# Patient Record
Sex: MALE | URBAN - METROPOLITAN AREA
[De-identification: names, ages, dates, MRNs, and addresses within clinical notes are randomized per-mention and may not be internally consistent; named-entity substitution may affect disease eponyms.]

---

## 2019-04-23 ENCOUNTER — APPOINTMENT (RX ONLY)
Dept: URBAN - METROPOLITAN AREA CLINIC 23 | Facility: CLINIC | Age: 39
Setting detail: DERMATOLOGY
End: 2019-04-23

## 2019-04-23 DIAGNOSIS — Z41.9 ENCOUNTER FOR PROCEDURE FOR PURPOSES OTHER THAN REMEDYING HEALTH STATE, UNSPECIFIED: ICD-10-CM

## 2019-04-23 PROCEDURE — ? FILLERS

## 2019-04-23 PROCEDURE — ? DYSPORT

## 2019-04-23 NOTE — PROCEDURE: DYSPORT
Additional Area 2 Location: bunny lines
Anterior Platysmal Bands Units: 0
Lot #: [id number]
Additional Area 1 Location: lateral brows
Detail Level: Zone
Forehead Units: [address] [facility]
Additional Area 4 Location: crows feet
Glabellar Complex Units: 15
Post-Care Instructions: Patient instructed to not lie down for 4 hours, limit physical activity for 24 hours and avoid manipulation of the treated areas.
Additional Area 6 Location: glabella
Dilution (U/ 0.1cc): 1.5
Expiration Date (Month Year): 10/31/2019
Consent: Written consent obtained. Risks include but not limited to lid/brow ptosis, bruising, swelling, diplopia, temporary effect, incomplete chemical denervation.

## 2019-04-23 NOTE — PROCEDURE: FILLERS
Brows Filler  Volume In Cc: 0
Include Cannula Information In Note?: Yes
Include Cannula Brand?: DermaSculpt
Include Cannula Size?: 25G
Map Statment: See [address] for Complete Details
Include Cannula Information In Note?: No
Additional Area 1 Location: forehead lines
Additional Anesthesia Volume In Cc: 6
Additional Area 1 Volume In Cc: 0.2
Expiration Date (Month Year): 05/31/2021
Include Cannula Length?: 1.5 inch
Additional Area 1 Location: Lips, Submental
Additional Area 2 Location: nasolabial folds, marionettes lines using a acannula lateral mouth lines, lips
Additional Area 1 Location: left dorsal hand using an acannula
Lot #: [id number]
Price (Use Numbers Only, No Special Characters Or $): 0.00
Additional Area 2 Location: lateral jawline, Left marionette
Anesthesia Volume In Cc: 0.3
Additional Area 3 Location: Tear troughs with a Cannula, nasalabial folds, oral commissure, marionette lines
Additional Area 1 Location: left dorsal hand
Additional Area 3 Location: lateral jaw
Anesthesia Type: 1% lidocaine with epinephrine
Additional Area 4 Location: left hand
Detail Level: Zone
Post-Care Instructions: Patient instructed to apply ice to reduce swelling.
Consent: Written consent obtained. Risks include but not limited to bruising, beading, irregular texture, ulceration, infection, allergic reaction, scar formation, incomplete augmentation, temporary nature, procedural pain.
Additional Area 4 Location: Left Cheek, Left nasalabial fold
Additional Area 5 Location: Marionette lines, Lateral Jawline
Lot #: [id number]
Lot #: [id number]
Expiration Date (Month Year): 07/31/2021
Lot #: [id number]
Filler: Restylane

## 2019-06-28 ENCOUNTER — APPOINTMENT (RX ONLY)
Dept: URBAN - METROPOLITAN AREA CLINIC 23 | Facility: CLINIC | Age: 39
Setting detail: DERMATOLOGY
End: 2019-06-28

## 2019-06-28 DIAGNOSIS — Z41.9 ENCOUNTER FOR PROCEDURE FOR PURPOSES OTHER THAN REMEDYING HEALTH STATE, UNSPECIFIED: ICD-10-CM

## 2019-06-28 PROCEDURE — ? ADDITIONAL NOTES

## 2019-06-28 NOTE — PROCEDURE: ADDITIONAL NOTES
Additional Notes: Pt will reschedule. Discussed having Ematrix done first, Then he will come back for fillers.
Detail Level: Zone

## 2019-09-18 ENCOUNTER — APPOINTMENT (RX ONLY)
Dept: URBAN - METROPOLITAN AREA CLINIC 23 | Facility: CLINIC | Age: 39
Setting detail: DERMATOLOGY
End: 2019-09-18

## 2019-09-18 DIAGNOSIS — Z41.9 ENCOUNTER FOR PROCEDURE FOR PURPOSES OTHER THAN REMEDYING HEALTH STATE, UNSPECIFIED: ICD-10-CM

## 2019-09-18 PROCEDURE — ? EMATRIX

## 2019-09-18 ASSESSMENT — LOCATION DETAILED DESCRIPTION DERM
LOCATION DETAILED: LEFT CENTRAL MALAR CHEEK
LOCATION DETAILED: LEFT SUPERIOR ANTERIOR NECK

## 2019-09-18 ASSESSMENT — LOCATION ZONE DERM
LOCATION ZONE: NECK
LOCATION ZONE: FACE

## 2019-09-18 ASSESSMENT — LOCATION SIMPLE DESCRIPTION DERM
LOCATION SIMPLE: LEFT CHEEK
LOCATION SIMPLE: LEFT ANTERIOR NECK

## 2019-09-18 NOTE — PROCEDURE: EMATRIX
Program: JIMMY
Consent: Written consent obtained, risks reviewed including but not limited to crusting, scabbing, blistering, scarring, darker or lighter pigmentary change, paradoxical hair regrowth, incomplete removal of hair and infection.
Passes (Optional): 2
Endpoint: Immediate endpoint: perifollicular erythema and edema. Hydroboost applied and samples given to pt. Post care reviewed with patient.
Fluence: 62 J
Pre-Procedure: Prior to the procedure all persons present in the exam put on protective eyewear.
Detail Level: Zone
Post-Care Instructions: I reviewed with the patient in detail post-care instructions. Patient should avoid sun for a minimum of 4 weeks before and after treatment.
Use Hsv Prophylaxis: No

## 2019-10-30 ENCOUNTER — RX ONLY (OUTPATIENT)
Age: 39
Setting detail: RX ONLY
End: 2019-10-30

## 2019-10-30 RX ORDER — TRETINOIN 0.5 MG/G
LOTION TOPICAL
Qty: 1 | Refills: 6 | Status: ERX | COMMUNITY
Start: 2019-10-30

## 2020-05-13 ENCOUNTER — RX ONLY (OUTPATIENT)
Age: 40
Setting detail: RX ONLY
End: 2020-05-13

## 2020-05-13 RX ORDER — TRETINOIN 0.5 MG/G
LOTION TOPICAL
Qty: 1 | Refills: 6 | Status: ERX

## 2020-07-15 ENCOUNTER — APPOINTMENT (RX ONLY)
Dept: URBAN - METROPOLITAN AREA CLINIC 23 | Facility: CLINIC | Age: 40
Setting detail: DERMATOLOGY
End: 2020-07-15

## 2020-07-15 VITALS — TEMPERATURE: 97.7 F

## 2020-07-15 DIAGNOSIS — Z41.9 ENCOUNTER FOR PROCEDURE FOR PURPOSES OTHER THAN REMEDYING HEALTH STATE, UNSPECIFIED: ICD-10-CM

## 2020-07-15 PROCEDURE — ? RECOMMENDATIONS

## 2020-07-15 PROCEDURE — ? FILLERS

## 2020-07-15 NOTE — PROCEDURE: RECOMMENDATIONS
Detail Level: Detailed
Recommendation Preamble: The following recommendations were made during the visit: patient to monitor for any abnormalities and to follow up in 2 months

## 2020-07-15 NOTE — PROCEDURE: FILLERS
Include Cannula Information In Note?: No
Dorsal Hands Filler  Volume In Cc: 0
Additional Area 1 Location: forehead fine lines, glabella fine lines, NLFs
Additional Area 5 Location: left cheek
Include Cannula Length?: 1.5 inch
Additional Area 1 Location: chin
Additional Area 1 Volume In Cc: 1
Additional Area 2 Location: chin line, vermillion lips, lateral mouth, upper line brow, lateral cheeks
Lot #: [id number]
Detail Level: Detailed
Include Cannula Brand?: DermaSculpt
Additional Area 3 Location: oral commissures
Expiration Date (Month Year): 09/30/22
Price (Use Numbers Only, No Special Characters Or $): 0.00
Filler: Restylane-L
Additional Area 5 Location: oral commissures, upper lip lines, vermillion lips
Additional Area 4 Location: NLFs, lips, jawline, upper lip lines
Lot #: [id number]
Additional Area 5 Location: , jawline, lateral cheeks   marionette lines
Additional Area 1 Location: lips, vermillion lips.
Map Statment: See [address] for Complete Details
Include Cannula Size?: 25G
Anesthesia Type: 1% lidocaine with epinephrine
Expiration Date (Month Year): 03/01/20
Additional Area 2 Location: oral commissures ,marionettes lines ,lips, chin lines
Lot #: [id number]
Additional Area 3 Location: perioral fine lines, vermillion lips, NLFs. marionette lines upper lip lines
Additional Area 1 Location: lateral mouth, marionette lines, lateral cheeks.
Anesthesia Volume In Cc: 0.3
Lot #: [id number]
Consent: Written consent obtained. Risks include but not limited to bruising, beading, irregular texture, ulceration, infection, allergic reaction, scar formation, incomplete augmentation, temporary nature, procedural pain.
Expiration Date (Month Year): 09-
Post-Care Instructions: Patient instructed to apply ice to reduce swelling.
Additional Area 4 Location: era lobes, jawline
Additional Anesthesia Volume In Cc: 6
Expiration Date (Month Year): 05/19/2020

## 2020-08-19 ENCOUNTER — APPOINTMENT (RX ONLY)
Dept: URBAN - METROPOLITAN AREA CLINIC 23 | Facility: CLINIC | Age: 40
Setting detail: DERMATOLOGY
End: 2020-08-19

## 2020-08-19 DIAGNOSIS — Z41.9 ENCOUNTER FOR PROCEDURE FOR PURPOSES OTHER THAN REMEDYING HEALTH STATE, UNSPECIFIED: ICD-10-CM

## 2020-08-19 PROCEDURE — ? EMATRIX

## 2020-08-19 ASSESSMENT — LOCATION SIMPLE DESCRIPTION DERM: LOCATION SIMPLE: LEFT CHEEK

## 2020-08-19 ASSESSMENT — LOCATION DETAILED DESCRIPTION DERM: LOCATION DETAILED: LEFT SUPERIOR MEDIAL BUCCAL CHEEK

## 2020-08-19 ASSESSMENT — LOCATION ZONE DERM: LOCATION ZONE: FACE

## 2020-08-19 NOTE — PROCEDURE: EMATRIX
Consent: Written consent obtained, risks reviewed including but not limited to crusting, scabbing, blistering, scarring, darker or lighter pigmentary change, paradoxical hair regrowth, incomplete removal of hair and infection.
Use Hsv Prophylaxis: No
Passes (Optional): 1
Detail Level: Zone
Fluence: 62 J
Post-Care Instructions: I reviewed with the patient in detail post-care instructions. Patient should avoid sun for a minimum of 4 weeks before and after treatment.
Endpoint: Immediate endpoint: perifollicular erythema and edema. PRP applied and injected into tear trough. Post care reviewed with patient.
Tip (Optional): [employer]
Program: JIMMY

## 2020-10-16 ENCOUNTER — APPOINTMENT (RX ONLY)
Dept: URBAN - METROPOLITAN AREA CLINIC 23 | Facility: CLINIC | Age: 40
Setting detail: DERMATOLOGY
End: 2020-10-16

## 2020-10-16 DIAGNOSIS — Z41.9 ENCOUNTER FOR PROCEDURE FOR PURPOSES OTHER THAN REMEDYING HEALTH STATE, UNSPECIFIED: ICD-10-CM

## 2020-10-16 PROCEDURE — ? DYSPORT

## 2020-10-16 NOTE — PROCEDURE: DYSPORT
R Brow Units: 0
Show Masseter Units: Yes
Additional Area 5 Location: high forehead
Show Lcl Units: No
Additional Area 1 Location: glabella
Expiration Date (Month Year): 02/28/21
Lot #: [id number]
Additional Area 4 Location: neck bands
Glabellar Complex Units: [address] [facility]
Consent: Written consent obtained. Risks include but not limited to lid/brow ptosis, bruising, swelling, diplopia, temporary effect, incomplete chemical denervation.
Additional Area 6 Location: chin
Additional Area 3 Location: crows feet
Additional Area 2 Location: forehead
Dilution (U/ 0.1cc): 1.5
Post-Care Instructions: Patient instructed to not lie down for 4 hours, limit physical activity for 24 hours and avoid manipulation of the treated areas.
Detail Level: Detailed

## 2020-11-13 ENCOUNTER — RX ONLY (OUTPATIENT)
Age: 40
Setting detail: RX ONLY
End: 2020-11-13

## 2020-11-13 RX ORDER — TRETINOIN 0.5 MG/G
LOTION TOPICAL
Qty: 1 | Refills: 3 | Status: ERX

## 2020-12-04 ENCOUNTER — APPOINTMENT (RX ONLY)
Dept: URBAN - METROPOLITAN AREA CLINIC 23 | Facility: CLINIC | Age: 40
Setting detail: DERMATOLOGY
End: 2020-12-04

## 2020-12-10 ENCOUNTER — APPOINTMENT (RX ONLY)
Dept: URBAN - METROPOLITAN AREA CLINIC 23 | Facility: CLINIC | Age: 40
Setting detail: DERMATOLOGY
End: 2020-12-10

## 2020-12-10 DIAGNOSIS — Z41.9 ENCOUNTER FOR PROCEDURE FOR PURPOSES OTHER THAN REMEDYING HEALTH STATE, UNSPECIFIED: ICD-10-CM

## 2020-12-10 PROCEDURE — ? DYSPORT

## 2020-12-10 NOTE — PROCEDURE: DYSPORT
Additional Area 1 Location: high forehead (2cm above brow)
Show Additional Area 4: Yes
[facility] Units: 0
Show Right And Left Pupillary Line Units: No
Additional Area 5 Location: high forehead
Dilution (U/ 0.1cc): 1.5
Detail Level: Detailed
Additional Area 4 Location: left lateral brows
Post-Care Instructions: Patient instructed to not lie down for 4 hours, limit physical activity for 24 hours and avoid manipulation of the treated areas.
Glabellar Complex Units: 10
Expiration Date (Month Year): 03/31/21
Lot #: [id number]
Additional Area 3 Location: high forehead 2 cm above brows
Consent: Written consent obtained. Risks include but not limited to lid/brow ptosis, bruising, swelling, diplopia, temporary effect, incomplete chemical denervation.
Additional Area 6 Location: chin
Additional Area 2 Location: lateral eyes

## 2021-01-21 ENCOUNTER — APPOINTMENT (RX ONLY)
Dept: URBAN - METROPOLITAN AREA CLINIC 23 | Facility: CLINIC | Age: 41
Setting detail: DERMATOLOGY
End: 2021-01-21

## 2021-01-21 VITALS — TEMPERATURE: 97.5 F

## 2021-01-21 DIAGNOSIS — L82.0 INFLAMED SEBORRHEIC KERATOSIS: ICD-10-CM

## 2021-01-21 DIAGNOSIS — D18.0 HEMANGIOMA: ICD-10-CM

## 2021-01-21 PROBLEM — D18.01 HEMANGIOMA OF SKIN AND SUBCUTANEOUS TISSUE: Status: ACTIVE | Noted: 2021-01-21

## 2021-01-21 PROCEDURE — ? BENIGN DESTRUCTION

## 2021-01-21 PROCEDURE — 17110 DESTRUCTION B9 LES UP TO 14: CPT

## 2021-01-21 ASSESSMENT — LOCATION DETAILED DESCRIPTION DERM
LOCATION DETAILED: LEFT MID-UPPER BACK
LOCATION DETAILED: MID POSTERIOR NECK

## 2021-01-21 ASSESSMENT — LOCATION SIMPLE DESCRIPTION DERM
LOCATION SIMPLE: POSTERIOR NECK
LOCATION SIMPLE: LEFT UPPER BACK

## 2021-01-21 ASSESSMENT — LOCATION ZONE DERM
LOCATION ZONE: NECK
LOCATION ZONE: TRUNK

## 2021-01-21 NOTE — PROCEDURE: BENIGN DESTRUCTION
Consent: The patient's consent was obtained including but not limited to risks of crusting, scabbing, blistering, scarring, darker or lighter pigmentary change, recurrence, incomplete removal and infection.
Add 52 Modifier (Optional): no
Treatment Number (Will Not Render If 0): 0
Medical Necessity Clause: This procedure was medically necessary because the lesions that were treated were:
Post-Care Instructions: I reviewed with the patient in detail post-care instructions. Patient is to wear sunprotection, and avoid picking at any of the treated lesions. Pt may apply Vaseline to crusted or scabbing areas.
Detail Level: Detailed
Anesthesia Volume In Cc: 0.5
Medical Necessity Information: It is in your best interest to select a reason for this procedure from the list below. All of these items fulfill various CMS LCD requirements except the new and changing color options.
Anesthesia Type: 1% Xylocaine with [id number] epinephrine and sodium bicarbonate

## 2021-03-05 ENCOUNTER — RX ONLY (OUTPATIENT)
Age: 41
Setting detail: RX ONLY
End: 2021-03-05

## 2021-03-05 RX ORDER — TRETINOIN 0.5 MG/G
LOTION TOPICAL
Qty: 1 | Refills: 2 | Status: ERX

## 2021-04-20 ENCOUNTER — APPOINTMENT (RX ONLY)
Dept: URBAN - METROPOLITAN AREA CLINIC 23 | Facility: CLINIC | Age: 41
Setting detail: DERMATOLOGY
End: 2021-04-20

## 2021-04-20 VITALS — TEMPERATURE: 97.5 F

## 2021-04-20 DIAGNOSIS — D18.0 HEMANGIOMA: ICD-10-CM

## 2021-04-20 PROBLEM — D23.39 OTHER BENIGN NEOPLASM OF SKIN OF OTHER PARTS OF FACE: Status: ACTIVE | Noted: 2021-04-20

## 2021-04-20 PROBLEM — D18.01 HEMANGIOMA OF SKIN AND SUBCUTANEOUS TISSUE: Status: ACTIVE | Noted: 2021-04-20

## 2021-04-20 PROCEDURE — ? BENIGN DESTRUCTION

## 2021-04-20 PROCEDURE — ? COUNSELING

## 2021-04-20 PROCEDURE — 17110 DESTRUCTION B9 LES UP TO 14: CPT

## 2021-04-20 ASSESSMENT — LOCATION DETAILED DESCRIPTION DERM
LOCATION DETAILED: LEFT MEDIAL FOREHEAD
LOCATION DETAILED: LEFT CENTRAL MALAR CHEEK

## 2021-04-20 ASSESSMENT — LOCATION SIMPLE DESCRIPTION DERM
LOCATION SIMPLE: LEFT FOREHEAD
LOCATION SIMPLE: LEFT CHEEK

## 2021-04-20 ASSESSMENT — LOCATION ZONE DERM: LOCATION ZONE: FACE

## 2021-04-20 NOTE — PROCEDURE: BENIGN DESTRUCTION
Render Post-Care Instructions In Note?: no
Detail Level: Detailed
Medical Necessity Clause: This procedure was medically necessary because the lesions that were treated were:
Medical Necessity Information: It is in your best interest to select a reason for this procedure from the list below. All of these items fulfill various CMS LCD requirements except the new and changing color options.
Post-Care Instructions: I reviewed with the patient in detail post-care instructions. Patient is to wear sunprotection, and avoid picking at any of the treated lesions. Pt may apply Vaseline to crusted or scabbing areas.
Consent: The patient's consent was obtained including but not limited to risks of crusting, scabbing, blistering, scarring, darker or lighter pigmentary change, recurrence, incomplete removal and infection.
Anesthesia Volume In Cc: 0.3
Treatment Number (Will Not Render If 0): 0

## 2021-07-14 ENCOUNTER — APPOINTMENT (RX ONLY)
Dept: URBAN - METROPOLITAN AREA CLINIC 23 | Facility: CLINIC | Age: 41
Setting detail: DERMATOLOGY
End: 2021-07-14

## 2021-07-14 DIAGNOSIS — Z41.9 ENCOUNTER FOR PROCEDURE FOR PURPOSES OTHER THAN REMEDYING HEALTH STATE, UNSPECIFIED: ICD-10-CM

## 2021-07-14 PROCEDURE — ? EMATRIX

## 2021-07-14 ASSESSMENT — LOCATION SIMPLE DESCRIPTION DERM: LOCATION SIMPLE: RIGHT CHEEK

## 2021-07-14 ASSESSMENT — LOCATION DETAILED DESCRIPTION DERM: LOCATION DETAILED: RIGHT INFERIOR CENTRAL MALAR CHEEK

## 2021-07-14 ASSESSMENT — LOCATION ZONE DERM: LOCATION ZONE: FACE

## 2021-07-14 NOTE — PROCEDURE: EMATRIX
Post-Care Instructions: I reviewed with the patient in detail post-care instructions. Patient should avoid sun for a minimum of 4 weeks before and after treatment.
Fluence: 56 J
Program: JIMMY
Endpoint: Immediate endpoint: perifollicular erythema and edema. PRP applied and injected into tear trough. Post care reviewed with patient.
Detail Level: Zone
Passes (Optional): 1
Pulses (Optional): 4
Consent: Written consent obtained, risks reviewed including but not limited to crusting, scabbing, blistering, scarring, darker or lighter pigmentary change, paradoxical hair regrowth, incomplete removal of hair and infection.
Device Serial Number (Optional): face
Use Hsv Prophylaxis: No

## 2021-08-25 ENCOUNTER — APPOINTMENT (RX ONLY)
Dept: URBAN - METROPOLITAN AREA CLINIC 23 | Facility: CLINIC | Age: 41
Setting detail: DERMATOLOGY
End: 2021-08-25

## 2021-08-25 DIAGNOSIS — Z41.9 ENCOUNTER FOR PROCEDURE FOR PURPOSES OTHER THAN REMEDYING HEALTH STATE, UNSPECIFIED: ICD-10-CM

## 2021-08-25 PROCEDURE — ? FILLERS

## 2021-08-25 NOTE — PROCEDURE: FILLERS
Additional Area 2 Location: vermillion lips, lateral mouth, chin
Additional Area 4 Volume In Cc: 0
Post-Care Instructions: Patient instructed to apply ice to reduce swelling.
Additional Area 5 Location: oral commissures, marionette lines, jawline
Include Cannula Information In Note?: No
Include Cannula Length?: 1.5 inch
Include Cannula Brand?: DermaSculpt
Anesthesia Volume In Cc: 0.3
Include Cannula Size?: 25G
Additional Area 1 Location: lateral mouth, lips, perioral fine lines, glabellar fine lines.
Detail Level: Detailed
Lot #: [id number]
Additional Area 3 Location: perioral fine lines, vermillion lips, NLFs. marionette lines upper lip lines
Additional Area 4 Location: chin, lateral cheeks, NLFâs, marionette lines
Additional Anesthesia Volume In Cc: 6
Price (Use Numbers Only, No Special Characters Or $): 0.00
Additional Area 1 Location: chin lines, marionette lines (physician sample)
Expiration Date (Month Year): 2024-01-31
Include Cannula Information In Note?: Yes
Additional Area 5 Location: cheeks (cannula used) tear throughs
Lot #: [id number]
Map Statment: See [address] for Complete Details
Additional Area 1 Location: upper lip lines , vermilion lip, oral commissures, marionette lines
Filler: Restylane-L
Expiration Date (Month Year): 2024-01
Additional Area 5 Location: oral commissures, upper lip lines, vermillion lips
Lot #: [id number]
Additional Area 1 Location: with cannula to medial cheeks, tear through. with conventional needle to marionette lines.
Additional Area 2 Location: [facility]
Lot #: [id number]
Expiration Date (Month Year): 2023-01-31
Expiration Date (Month Year): 07/20/21
Additional Area 3 Location: right cheek, nfls, marionette lines
Additional Area 3 Volume In Cc: 1
Additional Area 4 Location: lips, upper lip lines marionette lines, oral commissures
Anesthesia Type: 1% lidocaine with epinephrine
Consent: Written consent obtained. Risks include but not limited to bruising, beading, irregular texture, ulceration, infection, allergic reaction, scar formation, incomplete augmentation, temporary nature, procedural pain.

## 2021-11-23 ENCOUNTER — RX ONLY (OUTPATIENT)
Age: 41
Setting detail: RX ONLY
End: 2021-11-23

## 2021-11-23 RX ORDER — TRETINOIN 0.5 MG/G
LOTION TOPICAL
Qty: 1 | Refills: 0 | Status: ERX | COMMUNITY
Start: 2021-11-23

## 2022-01-26 ENCOUNTER — APPOINTMENT (RX ONLY)
Dept: URBAN - METROPOLITAN AREA CLINIC 23 | Facility: CLINIC | Age: 42
Setting detail: DERMATOLOGY
End: 2022-01-26

## 2022-01-26 DIAGNOSIS — Z41.9 ENCOUNTER FOR PROCEDURE FOR PURPOSES OTHER THAN REMEDYING HEALTH STATE, UNSPECIFIED: ICD-10-CM

## 2022-01-26 PROCEDURE — ? EMATRIX

## 2022-01-26 ASSESSMENT — LOCATION ZONE DERM: LOCATION ZONE: FACE

## 2022-01-26 ASSESSMENT — LOCATION DETAILED DESCRIPTION DERM
LOCATION DETAILED: LEFT CENTRAL MALAR CHEEK
LOCATION DETAILED: LEFT SUPERIOR CENTRAL MALAR CHEEK

## 2022-01-26 ASSESSMENT — LOCATION SIMPLE DESCRIPTION DERM: LOCATION SIMPLE: LEFT CHEEK

## 2022-01-26 NOTE — PROCEDURE: EMATRIX
Post-Care Instructions: I reviewed with the patient in detail post-care instructions. Patient should avoid sun for a minimum of 4 weeks before and after treatment.
Fluence: 53 J
Consent: Written consent obtained, risks reviewed including but not limited to crusting, scabbing, blistering, scarring, darker or lighter pigmentary change, paradoxical hair regrowth, incomplete removal of hair and infection.
Detail Level: Zone
Price (Use Numbers Only, No Special Characters Or $): [address]
Device Serial Number (Optional): skin discoloration
Use Hsv Prophylaxis: No
Passes (Optional): 1
Pulses (Optional): [address]
Endpoint: Immediate endpoint: perifollicular erythema and edema. Post care reviewed with patient.
Program: JIMMY

## 2022-03-31 ENCOUNTER — RX ONLY (OUTPATIENT)
Age: 42
Setting detail: RX ONLY
End: 2022-03-31

## 2022-03-31 RX ORDER — TRETINOIN 0.5 MG/G
LOTION TOPICAL
Qty: 45 | Refills: 0 | Status: ERX

## 2022-04-04 ENCOUNTER — RX ONLY (OUTPATIENT)
Age: 42
Setting detail: RX ONLY
End: 2022-04-04

## 2022-04-04 RX ORDER — TRETINOIN 0.5 MG/G
LOTION TOPICAL
Qty: 45 | Refills: 0 | Status: ERX

## 2022-04-29 ENCOUNTER — APPOINTMENT (RX ONLY)
Dept: URBAN - METROPOLITAN AREA CLINIC 23 | Facility: CLINIC | Age: 42
Setting detail: DERMATOLOGY
End: 2022-04-29

## 2022-04-29 DIAGNOSIS — Z41.9 ENCOUNTER FOR PROCEDURE FOR PURPOSES OTHER THAN REMEDYING HEALTH STATE, UNSPECIFIED: ICD-10-CM

## 2022-04-29 PROCEDURE — ? IN-HOUSE DISPENSING PHARMACY

## 2022-04-29 NOTE — PROCEDURE: IN-HOUSE DISPENSING PHARMACY
Product 75 Unit Type: mg
Product 80 Units Dispensed: 0
see chief complaint quote
Product 6 Unit Type: grams
Name Of Product 4: Valium 5 mg
Product 11 Unit Type: jar(s)
Product 2 Amount/Unit (Numbers Only): 1
Product 21 Application Directions: Use as directed
Product 14 Amount/Unit (Numbers Only): [address]
Product 13 Application Directions: Apply to wound site every 48 hours as indicated.
Name Of Product 16: Latisse
Name Of Product 19: Upneeq ophthalmic solution
Name Of Product 7: Adal Stevens
Name Of Product 12: Skin Better Even tone Serum
Product 9 Application Directions: Apply to affected area on the face area am as indicated.
Product 1 Application Directions: Apply to entire face at bedtime as indicated.
Product 21 Unit Type: kit(s)
Product 13 Unit Type: unit(s)
Product 4 Application Directions: Take 1 tablet today in office prior to procedure for anxiety.
Name Of Product 22: Calcipotriene/ 5 FLuoracil cream
Render Product Pricing In Note: No
Product 12 Unit Type: bottle(s)
Product 16 Amount/Unit (Numbers Only): 5
Product 15 Application Directions: Take one tablet twice daily for 3-6 months
Product 3 Application Directions: Apply to face twice daily as indicated
Name Of Product 21: Defenage skincare
Name Of Product 13: Duoderm thin
Product 11 Application Directions: Apply to the face at bedtime
Product 6 Application Directions: Apply pea size amount with cereve cream to chest at bedtime only.
Name Of Product 9: Brightening pads 6%
Name Of Product 1: Tri-Kenisha cream
Product 15 Unit Type: tablets
Product 2 Application Directions: Apply a pea size amount to Entire face at bedtime
Product 14 Application Directions: Take one tablet prior to procedure. Continue twice daily x 3 days as indicated.
Name Of Product 8: Prednisone 20 mg
Product 17 Application Directions: Take one tablet 20 mg today in office and 10 mg tomorrow if needed for swelling. Dispense in office.
Product 1 Price/Unit (In Dollars): 0.00
Product 10 Application Directions: Take two tablets x3 days
Product 5 Application Directions: Apply 1 drop to both Eyelashes at bedtime only
Product 2 Unit Type: tube(s)
Detail Level: Detailed
Name Of Product 3: Hydroquinone brightening pads 8%
Name Of Product 15: Viviscal PRO
Product 8 Application Directions: Take 1 tablet for 3 days . Dispense in office.
Name Of Product 18: Chavo Fuentes
Name Of Product 11: Hydroquinone 8% pads
Product 12 Refills: 2
Name Of Product 6: Tretinoin 0.05% cream
Product 16 Application Directions: Apply to lashes at bedtime only
Product 19 Application Directions: Apply once daily to each eye.
Name Of Product 14: Valtrex 500mg
Product 7 Application Directions: Apply one drop in each eye
Product 12 Application Directions: Apply to the entire face in the Am and Pm
Name Of Product 10: Prednisone 20mg
Product 16 Unit Type: ml
Name Of Product 5: Latisse 3 ml
Product 15 Amount/Unit (Numbers Only): [address] [facility]
Product 22 Application Directions: Apply small amount to temple and forehead twice daily as indicated x 3 or 5 days
Product 13 Price/Unit (In Dollars): $10.00
Name Of Product 20: Apply pea size amount to entire face at bedtime only at bedtime only.
Product 6 Amount/Unit (Numbers Only): [address] [facility] [place]

## 2022-07-19 ENCOUNTER — APPOINTMENT (RX ONLY)
Dept: URBAN - METROPOLITAN AREA CLINIC 23 | Facility: CLINIC | Age: 42
Setting detail: DERMATOLOGY
End: 2022-07-19

## 2022-07-19 DIAGNOSIS — Z41.9 ENCOUNTER FOR PROCEDURE FOR PURPOSES OTHER THAN REMEDYING HEALTH STATE, UNSPECIFIED: ICD-10-CM

## 2022-07-19 PROCEDURE — ? EMATRIX

## 2022-07-19 PROCEDURE — ? DYSPORT

## 2022-07-19 ASSESSMENT — LOCATION SIMPLE DESCRIPTION DERM
LOCATION SIMPLE: LEFT ANTERIOR NECK
LOCATION SIMPLE: RIGHT CHEEK
LOCATION SIMPLE: LEFT CHEEK
LOCATION SIMPLE: RIGHT FOREHEAD
LOCATION SIMPLE: INFERIOR FOREHEAD

## 2022-07-19 ASSESSMENT — LOCATION DETAILED DESCRIPTION DERM
LOCATION DETAILED: LEFT SUPERIOR ANTERIOR NECK
LOCATION DETAILED: RIGHT FOREHEAD
LOCATION DETAILED: RIGHT CENTRAL MALAR CHEEK
LOCATION DETAILED: LEFT CENTRAL MALAR CHEEK
LOCATION DETAILED: INFERIOR MID FOREHEAD

## 2022-07-19 ASSESSMENT — LOCATION ZONE DERM
LOCATION ZONE: FACE
LOCATION ZONE: NECK

## 2022-07-19 NOTE — PROCEDURE: DYSPORT
Show Mentalis Units: No
Mentalis Units: 0
Additional Area 6 Location: [place]
Show Periorbital Units: Yes
Additional Area 3 Location: lateral eyes
Show Inventory Tab: Show
Lot #: [id number]
Additional Area 4 Location: chin
Additional Area 1 Location: glabella
Dilution (U/ 0.1cc): 1.5
Consent: Written consent obtained. Risks include but not limited to lid/brow ptosis, bruising, swelling, diplopia, temporary effect, incomplete chemical denervation.
Additional Area 5 Location: under eyes
Post-Care Instructions: Patient instructed to not lie down for 4 hours, limit physical activity for 24 hours and avoid manipulation of the treated areas.
Detail Level: Zone
Additional Area 2 Location: lateral brows
Forehead Units: [address] [facility]
Expiration Date (Month Year): 2022-10

## 2022-07-19 NOTE — PROCEDURE: EMATRIX
Program: JIMMY
Endpoint: Immediate endpoint: perifollicular erythema and edema. Post care reviewed with patient.
Price (Use Numbers Only, No Special Characters Or $): [address] [employer]
Use Hsv Prophylaxis: No
Detail Level: Zone
Consent: Written consent obtained, risks reviewed including but not limited to crusting, scabbing, blistering, scarring, darker or lighter pigmentary change, paradoxical hair regrowth, incomplete removal of hair and infection.
Fluence: 62 J
Passes (Optional): 1
Post-Care Instructions: I reviewed with the patient in detail post-care instructions. Patient should avoid sun for a minimum of 4 weeks before and after treatment.

## 2022-09-09 ENCOUNTER — RX ONLY (OUTPATIENT)
Age: 42
Setting detail: RX ONLY
End: 2022-09-09

## 2022-09-09 RX ORDER — TRETINOIN 0.5 MG/G
LOTION TOPICAL
Qty: 45 | Refills: 0 | Status: ERX

## 2023-01-31 ENCOUNTER — RX ONLY (OUTPATIENT)
Age: 43
Setting detail: RX ONLY
End: 2023-01-31

## 2023-01-31 RX ORDER — TRETINOIN 0.5 MG/G
LOTION TOPICAL
Qty: 45 | Refills: 2 | Status: ERX

## 2023-03-06 ENCOUNTER — APPOINTMENT (RX ONLY)
Dept: URBAN - METROPOLITAN AREA CLINIC 23 | Facility: CLINIC | Age: 43
Setting detail: DERMATOLOGY
End: 2023-03-06

## 2023-03-06 DIAGNOSIS — Z41.9 ENCOUNTER FOR PROCEDURE FOR PURPOSES OTHER THAN REMEDYING HEALTH STATE, UNSPECIFIED: ICD-10-CM

## 2023-03-06 PROCEDURE — ? EMATRIX

## 2023-03-06 ASSESSMENT — LOCATION DETAILED DESCRIPTION DERM
LOCATION DETAILED: LEFT LATERAL EYEBROW
LOCATION DETAILED: RIGHT INFERIOR ANTERIOR NECK
LOCATION DETAILED: LEFT SUPERIOR CENTRAL MALAR CHEEK
LOCATION DETAILED: RIGHT CENTRAL EYEBROW
LOCATION DETAILED: RIGHT SUPERIOR CENTRAL MALAR CHEEK

## 2023-03-06 ASSESSMENT — LOCATION ZONE DERM
LOCATION ZONE: NECK
LOCATION ZONE: FACE

## 2023-03-06 ASSESSMENT — LOCATION SIMPLE DESCRIPTION DERM
LOCATION SIMPLE: RIGHT ANTERIOR NECK
LOCATION SIMPLE: RIGHT CHEEK
LOCATION SIMPLE: LEFT CHEEK
LOCATION SIMPLE: RIGHT EYEBROW
LOCATION SIMPLE: LEFT EYEBROW

## 2023-03-06 NOTE — PROCEDURE: EMATRIX
Pre-Procedure: Prior to the procedure all persons present in the exam put on protective eyewear.
Program: JIMMY
Price (Use Numbers Only, No Special Characters Or $): [address]
Consent: Written consent obtained, risks reviewed including but not limited to crusting, scabbing, blistering, scarring, darker or lighter pigmentary change, paradoxical hair regrowth, incomplete removal of hair and infection.
Post-Care Instructions: Treatment was not performed today because patient will not be able to avoid sun exposure this weekend.  Norbert Porter
Fluence: 62 J
Hsv Prophylaxis: valtrex 500mg
Pulses (Optional): [address] [facility]
Use Hsv Prophylaxis: No
Passes (Optional): 1
Detail Level: Zone
Endpoint: Immediate endpoint: perifollicular erythema and edema. Vaseline and ice applied. Post care reviewed with patient.

## 2023-04-18 ENCOUNTER — APPOINTMENT (RX ONLY)
Dept: URBAN - METROPOLITAN AREA CLINIC 23 | Facility: CLINIC | Age: 43
Setting detail: DERMATOLOGY
End: 2023-04-18

## 2023-04-18 DIAGNOSIS — Z41.9 ENCOUNTER FOR PROCEDURE FOR PURPOSES OTHER THAN REMEDYING HEALTH STATE, UNSPECIFIED: ICD-10-CM

## 2023-04-18 PROCEDURE — ? ADDITIONAL NOTES

## 2023-04-18 NOTE — PROCEDURE: ADDITIONAL NOTES
Render Risk Assessment In Note?: no
Detail Level: Simple
Additional Notes: Reassured patient that the pigmentation in the under eyes will subside in a couple of weeks and is still healing from previous Ematrix treatment. \\nRecommended patient to schedule Ematrix or Frax laser in the face and neck when pigmentation subsides.

## 2023-06-06 ENCOUNTER — APPOINTMENT (RX ONLY)
Dept: URBAN - METROPOLITAN AREA CLINIC 23 | Facility: CLINIC | Age: 43
Setting detail: DERMATOLOGY
End: 2023-06-06

## 2023-06-06 DIAGNOSIS — Z41.9 ENCOUNTER FOR PROCEDURE FOR PURPOSES OTHER THAN REMEDYING HEALTH STATE, UNSPECIFIED: ICD-10-CM

## 2023-06-06 PROCEDURE — ? EMATRIX

## 2023-06-06 PROCEDURE — ? INVENTORY

## 2023-06-06 PROCEDURE — ? DYSPORT

## 2023-06-06 ASSESSMENT — LOCATION SIMPLE DESCRIPTION DERM
LOCATION SIMPLE: RIGHT CHEEK
LOCATION SIMPLE: LEFT CHEEK

## 2023-06-06 ASSESSMENT — LOCATION DETAILED DESCRIPTION DERM
LOCATION DETAILED: RIGHT CENTRAL MALAR CHEEK
LOCATION DETAILED: LEFT CENTRAL MALAR CHEEK

## 2023-06-06 ASSESSMENT — LOCATION ZONE DERM: LOCATION ZONE: FACE

## 2023-06-06 NOTE — PROCEDURE: DYSPORT
Show Lateral Platysmal Band Units: Yes
Show Right And Left Brow Units: No
Post-Care Instructions: Patient instructed to not lie down for 4 hours, limit physical activity for 24 hours and avoid manipulation of the treated areas.
Masseter Units: 0
Dilution (U/ 0.1cc): 1.5
Additional Area 5 Location: chin
Additional Area 2 Location: high forehead ( touch up)
Additional Area 3 Location: lateral eyes
Additional Area 6 Location: lip flip
Detail Level: Zone
Consent: Written consent obtained. Risks include but not limited to lid/brow ptosis, bruising, swelling, diplopia, temporary effect, incomplete chemical denervation.
Expiration Date (Month Year): 2022-10
Lot #: [id number]
Additional Area 4 Location: lateral brows
Glabellar Complex Units: [address] [facility]
Show Inventory Tab: Show

## 2023-06-06 NOTE — PROCEDURE: EMATRIX
Post-Care Instructions: I reviewed with the patient in detail post-care instructions. Patient should avoid sun for a minimum of 4 weeks before and after treatment.
Detail Level: Zone
Fluence: 100 J
Program: JIMMY
Consent: Written consent obtained, risks reviewed including but not limited to crusting, scabbing, blistering, scarring, darker or lighter pigmentary change, paradoxical hair regrowth, incomplete removal of hair and infection.
Endpoint: Immediate endpoint: perifollicular erythema and edema. Post care reviewed with patient.
Price (Use Numbers Only, No Special Characters Or $): [address] [facility]
Use Hsv Prophylaxis: No

## 2023-06-30 ENCOUNTER — APPOINTMENT (RX ONLY)
Dept: URBAN - METROPOLITAN AREA CLINIC 23 | Facility: CLINIC | Age: 43
Setting detail: DERMATOLOGY
End: 2023-06-30

## 2023-06-30 DIAGNOSIS — Z41.9 ENCOUNTER FOR PROCEDURE FOR PURPOSES OTHER THAN REMEDYING HEALTH STATE, UNSPECIFIED: ICD-10-CM

## 2023-06-30 PROCEDURE — ? IN-HOUSE DISPENSING PHARMACY

## 2023-06-30 NOTE — PROCEDURE: IN-HOUSE DISPENSING PHARMACY
Product 19 Units Dispensed: 0
Product 12 Application Directions: Apply to the entire face in the Am and Pm
Product 22 Application Directions: Apply small amount to temple and forehead twice daily as indicated x 3 or 5 days
Product 5 Application Directions: Apply a pea size amount to Entire face at bedtime
Product 70 Unit Type: mg
Product 20 Amount/Unit (Numbers Only): 1
Product 13 Application Directions: Apply to wound site every 48 hours as indicated.
Product 2 Application Directions: Mix with cerave cream and at bedtime only.
Product 1 Price/Unit (In Dollars): 0.00
Product 16 Application Directions: Apply to lashes at bedtime only
Product 8 Application Directions: Take 1 tablet before the procedure by mouth
Product 13 Unit Type: unit(s)
Product 12 Unit Type: bottle(s)
Product 24 Price/Unit (In Dollars): [facility]
Product 5 Unit Type: grams
Product 2 Unit Type: tube(s)
Product 19 Application Directions: Apply thin layer at bedtime to melasma.
Product 16 Unit Type: ml
Product 11 Application Directions: Take one today in office and one tablet tomorrow for swelling and next day as needed.
Name Of Product 3: Hydroquinone brightening pads 10%
Name Of Product 6: Tylenol 500mg
Name Of Product 14: Valtrex 1 gram
Product 8 Unit Type: tablets
Detail Level: Detailed
Name Of Product 17: Prednisone 20 mg
Product 1 Amount/Unit (Numbers Only): 20
Product 18 Refills: 2
Name Of Product 23: Diazepam (Valium)
Name Of Product 13: Duoderm thin
Product 15 Amount/Unit (Numbers Only): 57[address] Chris Banks
Name Of Product 9: Brightening pads10%
Product 10 Application Directions: Take  1 tablet now and one tomorrow
Product 7 Unit Type: kit(s)
Name Of Product 5: Varun Harrington
Name Of Product 2: Tretinoin 0.05%
Name Of Product 8: Valium 5 mg
Name Of Product 16: Latisse
Name Of Product 22: Calcipotriene/ 5 FLuoracil cream
Product 14 Amount/Unit (Numbers Only): [address]
Name Of Product 19: Kaye Hong
Name Of Product 25: Valium 5mg
Product 23 Amount/Unit (Numbers Only): 5
Name Of Product 15: William Kehr
Name Of Product 4: Latisse 5 ml
Name Of Product 1: 5-FU
Name Of Product 7: Lizzy Bustamante
Product 9 Unit Type: jar(s)
Name Of Product 24: Tretinoin 0.05% cream
Product 5 Amount/Unit (Numbers Only): [facility]
Name Of Product 18: Tretinoin cream 0.05%
Name Of Product 10: Prednisone 20mg tablet
Product 7 Application Directions: Apply one drop in each eye
Product 21 Application Directions: Apply pea size amount to entire face at bedtime only
Product 4 Application Directions: Apply to Eyelids at bedtime
Product 1 Application Directions: Apply to the one spot on the cheek twice a day 3-5 days
Product 11 Amount/Unit (Numbers Only): 3
Product 15 Application Directions: Apply to affected skin as indicated
Product 13 Price/Unit (In Dollars): $10.00
Product 18 Application Directions: Mix with cerave cream and apply to body daily after shower
Product 24 Application Directions: Apply pea size amount to entire face at bedtime only.
Name Of Product 20: Alek Pleitez
Send Charges To Patient Encounter: No
Product 6 Application Directions: Take 1 pill by mouth today
Product 3 Application Directions: Apply to face in pm as indicated
Name Of Product 12: Skin Better Even tone Serum
Product 14 Application Directions: Take one tablet prior to procedure. Continue twice daily x 3 days as indicated.
Product 9 Application Directions: Apply to affected area on the face area am as indicated.
Product 17 Application Directions: Take one tablet 20 mg today in office and 10 mg tomorrow if needed for swelling. Dispense in office.
Product 23 Application Directions: Take by mouth.
Product 26 Application Directions: Take one pill by mouth
Product 20 Application Directions: Apply thin layer at bedtime to entire face

## 2023-12-18 ENCOUNTER — APPOINTMENT (RX ONLY)
Dept: URBAN - METROPOLITAN AREA CLINIC 23 | Facility: CLINIC | Age: 43
Setting detail: DERMATOLOGY
End: 2023-12-18

## 2023-12-18 DIAGNOSIS — Z41.9 ENCOUNTER FOR PROCEDURE FOR PURPOSES OTHER THAN REMEDYING HEALTH STATE, UNSPECIFIED: ICD-10-CM

## 2023-12-18 PROCEDURE — ? IN-HOUSE DISPENSING PHARMACY

## 2023-12-18 NOTE — PROCEDURE: IN-HOUSE DISPENSING PHARMACY
Name Of Product 2: Hydroquinone pads
Product 17 Price/Unit (In Dollars): 0
Product 18 Unit Type: mg
Product 8 Application Directions: Take half (10mg) the day post procedure and second half the following day but only if needed
Product 3 Amount/Unit (Numbers Only): 5
Product 11 Unit Type: ml
Product 5 Application Directions: Apply one pea sized amount to entire face at bedtime
Product 10 Refills: 1
Name Of Product 12: Upneeq
Product 5 Unit Type: grams
Product 7 Amount/Unit (Numbers Only): 10
Product 30 Amount/Unit (Numbers Only): .3
Name Of Product 11: Latisse
Product 12 Amount/Unit (Numbers Only): 0.3
Product 4 Application Directions: Apply pea size amount to face at bedtime
Name Of Product 8: Prednisone
Name Of Product 5: Tretinoin 0.05%
Product 1 Application Directions: QHS
Product 11 Application Directions: Apply one drop to upper lash line at bedtime
Send Charges To Patient Encounter: Yes
Product 8 Amount/Unit (Numbers Only): 20
Product 10 Application Directions: Take one pill daily for 3 days
Name Of Product 4: Hydroquinone/tretinoin/phos
Product 30 Application Directions: Apply one drop in each eye daily
Product 7 Application Directions: Take 2 tablets today and one each day for the next 2 days
Product 12 Application Directions: Apply one drop to each eye.
Product 2 Application Directions: Apply to face 2 times daily.
Product 4 Amount/Unit (Numbers Only): 30
Product 2 Unit Type: jar(s)
Name Of Product 3: Valium
Detail Level: Zone
Name Of Product 10: Valtrex

## 2024-04-25 ENCOUNTER — RX ONLY (OUTPATIENT)
Age: 44
Setting detail: RX ONLY
End: 2024-04-25

## 2024-04-25 RX ORDER — TRETINOIN 0.5 MG/G
LOTION TOPICAL
Qty: 45 | Refills: 3 | Status: ERX

## 2024-05-21 ENCOUNTER — APPOINTMENT (RX ONLY)
Dept: URBAN - METROPOLITAN AREA CLINIC 23 | Facility: CLINIC | Age: 44
Setting detail: DERMATOLOGY
End: 2024-05-21

## 2024-05-21 DIAGNOSIS — Z41.9 ENCOUNTER FOR PROCEDURE FOR PURPOSES OTHER THAN REMEDYING HEALTH STATE, UNSPECIFIED: ICD-10-CM

## 2024-05-21 PROCEDURE — ? RECOMMENDATIONS

## 2024-05-21 PROCEDURE — ? EMATRIX

## 2024-05-21 PROCEDURE — ? INVENTORY

## 2024-05-21 ASSESSMENT — LOCATION SIMPLE DESCRIPTION DERM
LOCATION SIMPLE: RIGHT CHEEK
LOCATION SIMPLE: RIGHT FOREHEAD
LOCATION SIMPLE: RIGHT ANTERIOR NECK
LOCATION SIMPLE: LEFT CHEEK

## 2024-05-21 ASSESSMENT — LOCATION ZONE DERM
LOCATION ZONE: NECK
LOCATION ZONE: FACE

## 2024-05-21 ASSESSMENT — LOCATION DETAILED DESCRIPTION DERM
LOCATION DETAILED: RIGHT MEDIAL FOREHEAD
LOCATION DETAILED: LEFT CENTRAL MALAR CHEEK
LOCATION DETAILED: RIGHT SUPERIOR ANTERIOR NECK
LOCATION DETAILED: RIGHT CENTRAL MALAR CHEEK

## 2024-05-21 NOTE — PROCEDURE: EMATRIX
Price (Use Numbers Only, No Special Characters Or $): 849
Passes (Optional): 1 Hz
Program: JIMMY
Detail Level: Zone
Hsv Prophylaxis: Valtrex 1 gram QD
Consent: Written consent obtained, risks reviewed including but not limited to crusting, scabbing, blistering, scarring, darker or lighter pigmentary change, paradoxical hair regrowth, incomplete removal of hair and infection.
Use Hsv Prophylaxis: No
Post-Care Instructions: I reviewed with the patient in detail post-care instructions. Patient should avoid sun for a minimum of 4 weeks before and after treatment.
Fluence: 80 J
Endpoint: Immediate endpoint: perifollicular erythema and edema. Post care reviewed with patient.
Detail Level: Simple

## 2024-05-21 NOTE — PROCEDURE: RECOMMENDATIONS
Detail Level: Detailed
Render Risk Assessment In Note?: no
Recommendations (Free Text): Sculptra on cheeks/ lateral cheeks $900 per vial

## 2024-10-22 ENCOUNTER — APPOINTMENT (RX ONLY)
Dept: URBAN - METROPOLITAN AREA CLINIC 23 | Facility: CLINIC | Age: 44
Setting detail: DERMATOLOGY
End: 2024-10-22

## 2024-10-22 DIAGNOSIS — Z41.9 ENCOUNTER FOR PROCEDURE FOR PURPOSES OTHER THAN REMEDYING HEALTH STATE, UNSPECIFIED: ICD-10-CM

## 2024-10-22 PROCEDURE — ? DYSPORT

## 2024-10-22 PROCEDURE — ? FILLERS

## 2024-10-22 NOTE — PROCEDURE: FILLERS
Mid Face Filler Volume In Cc: 0
Number Of Syringes (Required For Inventory): 1
Consent: Written consent obtained. Risks include but not limited to bruising, beading, irregular texture, ulceration, infection, allergic reaction, scar formation, incomplete augmentation, temporary nature, procedural pain.
Post-Care Instructions: Patient instructed to apply ice to reduce swelling.
Additional Area 2 Location: upper and lower lip
Include Cannula Information In Note?: No
Additional Area 1 Location: lateral jaw
Additional Area 3 Location: cheeks
Anesthesia Volume In Cc: 0.5
Include Cannula Information In Note?: Yes
Additional Area 2 Location: jose oral fine lines
Additional Area 1 Location: cheek fine lines
Additional Area 4 Location: jose oral
Additional Area 1 Location: left and right cheeks
Inventory Information: This plan will send filler information to inventory based on the fillers you select. Multiple fillers can be sent but you must ensure you select the appropriate fillers in the inventory tab.
Map Statment: See Attach Map for Complete Details
Include Cannula Brand?: DermaSculpt
Additional Area 5 Location: ear lobes
Additional Area 2 Location: Cheeks, Marionette lines
Detail Level: Detailed
Additional Area 3 Location: cheeks, Marionette lines, oral commissures
Include Cannula Size?: 25G
Additional Area 3 Location: cheeks, jawline
Additional Area 4 Location: Perioral lines, marionette lines, vermillion lips
Filler: Skinvive
Show Inventory Tab: Show
Include Cannula Length?: 1.5 inch
Additional Area 1 Location: marionette lines

## 2024-10-22 NOTE — PROCEDURE: DYSPORT
Left Pupillary Line Units: 0
Show Right And Left Pupillary Line Units: No
Show Masseter Units: Yes
Consent: Written consent obtained. Risks include but not limited to lid/brow ptosis, bruising, swelling, diplopia, temporary effect, incomplete chemical denervation.
Additional Area 1 Location: Nose
Post-Care Instructions: Patient instructed to not lie down for 4 hours, limit physical activity for 24 hours and avoid manipulation of the treated areas.
Additional Area 4 Location: Neck
Lot #: [id number]
Detail Level: Detailed
Additional Area 1 Units: 10
Additional Area 2 Location: chin
Additional Area 5 Location: Masseters
Glabellar Complex Units: 50
Dilution (U/0.1 Cc): 1.5
Show Inventory Tab: Show
Additional Area 3 Location: neck bands
Expiration Date (Month Year): 2022-10

## 2024-11-12 ENCOUNTER — RX ONLY (OUTPATIENT)
Age: 44
Setting detail: RX ONLY
End: 2024-11-12

## 2024-11-12 RX ORDER — TRETINOIN 0.5 MG/G
GEL TOPICAL
Qty: 45 | Refills: 0 | Status: ERX | COMMUNITY
Start: 2024-11-12

## 2024-12-03 ENCOUNTER — APPOINTMENT (OUTPATIENT)
Dept: URBAN - METROPOLITAN AREA CLINIC 23 | Facility: CLINIC | Age: 44
Setting detail: DERMATOLOGY
End: 2024-12-03

## 2024-12-03 DIAGNOSIS — Z41.9 ENCOUNTER FOR PROCEDURE FOR PURPOSES OTHER THAN REMEDYING HEALTH STATE, UNSPECIFIED: ICD-10-CM

## 2024-12-03 PROCEDURE — ? IN-HOUSE DISPENSING PHARMACY

## 2024-12-03 NOTE — PROCEDURE: IN-HOUSE DISPENSING PHARMACY
Product 35 Unit Type: mg
Product 7 Amount/Unit (Numbers Only): 1
Product 6 Application Directions: Take 1 pill by mouth daily for three days.
Product 23 Refills: 0
Product 23 Amount/Unit (Numbers Only): 5
Render Refills If Set To 0: No
Name Of Product 12: Skin Better Even tone Serum
Product 15 Application Directions: Apply to affected skin as indicated
Name Of Product 9: Brightening pads 8%
Product 16 Application Directions: Apply to lashes at bedtime only
Name Of Product 1: Hydroquinone brightens pads 8%
Name Of Product 26: Tylenol 500mg
Product 22 Application Directions: Apply small amount to temple and forehead twice daily as indicated x 3 or 5 days
Product 9 Application Directions: Apply to affected area on the face area am as indicated.
Product 4 Amount/Unit (Numbers Only): 5 mL
Name Of Product 19: Triluma
Product 13 Application Directions: Apply to wound site every 48 hours as indicated.
Product 21 Application Directions: Apply pea size amount to entire face at bedtime only
Product 15 Unit Type: ml
Product 3 Application Directions: Apply to face daily as indicated
Product 24 Price/Unit (In Dollars): 20
Product 14 Amount/Unit (Numbers Only): 500
Product 12 Application Directions: Apply to the entire face in the Am and Pm
Detail Level: Detailed
Product 20 Application Directions: Apply thin layer at bedtime to entire face
Product 2 Application Directions: Apply a thin layer to face at bed time only.\\nUse sunscreen every day in the AM.
Name Of Product 5: Upneeq
Name Of Product 6: Valtrex 1mg
Product 9 Unit Type: jar(s)
Product 11 Application Directions: Apply to eyelashes and eyebrows daily
Product 10 Application Directions: Apply to face once daily
Product 8 Application Directions: Take 1 tablet before the procedure by mouth
Name Of Product 15: [facility]
Product 20 Unit Type: bottle(s)
Product 2 Unit Type: grams
Product 26 Amount/Unit (Numbers Only): 2
Product 18 Application Directions: Mix with cerave cream and apply to body daily after shower
Product 4 Price/Unit (In Dollars): 0.00
Product 8 Unit Type: tablets
Name Of Product 3: Hydroquinone brightening pads 10%
Name Of Product 21: Tretinoin 0.05%
Product 17 Application Directions: Take one tablet 20 mg today in office and 10 mg tomorrow if needed for swelling. Dispense in office.
Product 5 Application Directions: Apply one drop to each eye
Product 13 Price/Unit (In Dollars): $10.00
Product 7 Unit Type: kit(s)
Product 23 Application Directions: Take by mouth.
Product 4 Application Directions: Apply to Eyebrows at bedtime
Name Of Product 11: Latisse 0.03%
Product 14 Application Directions: Take one tablet prior to procedure. Continue twice daily x 3 days as indicated.
Product 15 Amount/Unit (Numbers Only): 30
Name Of Product 8: Valium 5 mg
Name Of Product 24: Tretinoin 0.05% cream
Name Of Product 25: Valium 5mg
Name Of Product 18: Tretinoin cream 0.05%
Name Of Product 16: Latisse
Name Of Product 17: Prednisone 20 mg
Product 21 Unit Type: tube(s)
Product 1 Application Directions: Apply pad to face in the AM
Product 13 Unit Type: unit(s)
Product 19 Application Directions: Apply thin layer at bedtime to melasma.
Name Of Product 23: Diazepam (Valium)
Product 26 Application Directions: Take one pill by mouth
Name Of Product 4: Latisse 5 ml
Name Of Product 22: Calcipotriene/ 5 FLuoracil cream
Name Of Product 14: Valtrex 1 gram
Product 7 Application Directions: Apply one drop in each eye
Name Of Product 13: Duoderm thin
Product 24 Application Directions: Apply pea size amount to entire face at bedtime only.